# Patient Record
Sex: MALE
[De-identification: names, ages, dates, MRNs, and addresses within clinical notes are randomized per-mention and may not be internally consistent; named-entity substitution may affect disease eponyms.]

---

## 2018-09-05 ENCOUNTER — HOSPITAL ENCOUNTER (EMERGENCY)
Dept: HOSPITAL 25 - UCEAST | Age: 18
Discharge: HOME | End: 2018-09-05
Payer: COMMERCIAL

## 2018-09-05 VITALS — DIASTOLIC BLOOD PRESSURE: 75 MMHG | SYSTOLIC BLOOD PRESSURE: 123 MMHG

## 2018-09-05 DIAGNOSIS — Z88.0: ICD-10-CM

## 2018-09-05 DIAGNOSIS — J45.909: ICD-10-CM
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
## 2018-09-05 DIAGNOSIS — S43.005A: Primary | ICD-10-CM
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
## 2018-09-05 PROCEDURE — 99211 OFF/OP EST MAY X REQ PHY/QHP: CPT

## 2018-09-05 PROCEDURE — G0463 HOSPITAL OUTPT CLINIC VISIT: HCPCS

## 2018-09-05 NOTE — UC
Upper Extremity HPI





- HPI Summary


HPI Summary: 


A 19 y/o male presents to [facility] c/o resolved left shoulder dislocation. 

According to the patient, he fell on a rock while playing soccer at 1930. He 

stated that he tried pushing it back in place, however, it went back in on his 

own soon afterwards. Patient has no other known issues. Patient took 2 oral 

pills of Ibuprofen and applied ice to the area. PMHx of asthma. No current 

medications. No FHx of hypermobility. SHx of no smoking or ETOH. 











- History of Current Complaint


Chief Complaint: UCUpperExtremity


Stated Complaint: SHOULDER INJURY


Time Seen by Provider: 09/05/18 21:01


Hx Obtained From: Patient


Onset/Duration: Sudden Onset, Resolved


Severity Initially: Mild


Severity Currently: None


Pain Intensity: 3


Pain Scale Used: 0-10 Numeric


Location Of Pain: Is Discrete @ - Left shoulder


Aggravating Factor(s): Nothing


Alleviating Factor(s): Nothing


Associated Signs And Symptoms: Positive: Negative





- Allergies/Home Medications


Allergies/Adverse Reactions: 


 Allergies











Allergy/AdvReac Type Severity Reaction Status Date / Time


 


Penicillins Allergy  Unknown Verified 09/05/18 20:58





   Reaction  





   Details  











Home Medications: 


 Home Medications





Budesonide/Formote 80/4.5(NF) [Symbicort 80/4.5 (NF)] 1 puff INH BID 09/05/18 [

History Confirmed 09/05/18]


Ibuprofen TAB* [Motrin TAB* 400 MG] 1 tab PO ONCE 09/05/18 [History Confirmed 09 /05/18]











PMH/Surg Hx/FS Hx/Imm Hx


Respiratory History: Asthma





- Surgical History


Surgical History: None





- Family History


Known Family History: Positive: Other - Negative: Hypermobility





- Social History


Alcohol Use: None


Substance Use Type: None


Smoking Status (MU): Never Smoked Tobacco





Review of Systems


Constitutional: Negative


Skin: Negative


Eyes: Negative


ENT: Negative


Respiratory: Negative


Cardiovascular: Negative


Gastrointestinal: Negative


Genitourinary: Negative


Motor: Negative


Neurovascular: Negative


Musculoskeletal: Other: - POSITIVE: Resolved left shoulder dislocation


Neurological: Negative


Psychological: Negative


Is Patient Immunocompromised?: No


All Other Systems Reviewed And Are Negative: Yes





Physical Exam





- Summary


Physical Exam Summary: 


Appearance: Well appearing, no pain distress


Skin: warm, dry, reflects adequate perfusion


Head/face: normal


Eyes: EOMI, PEGGY


ENT: normal


Neck: supple, non-tender


Respiratory: CTA, breath sounds present


Cardiovascular: RRR, pulses symmetrical 


Abdomen: non-tender, soft


Bowel Sounds: present


Musculoskeletal: normal, strength/ROM intact. Left shoulder reduction that 

spontaneously reduced. Normal strength, sensation and FROM intact.


Neuro: normal, sensory motor intact, A&Ox3








Triage Information Reviewed: Yes


Vital Signs: 


 Initial Vital Signs











Temp  99.0 F   09/05/18 20:54


 


Pulse  57   09/05/18 20:54


 


Resp  18   09/05/18 20:54


 


BP  123/75   09/05/18 20:54


 


Pulse Ox  99   09/05/18 20:54











Vital Signs Reviewed: Yes





Diagnostics





- Radiology


  ** No standard instances


Radiology Interpretation Completed By: ED Physician - Negative. Pending 

official report.





Upper Extremity Course/Dx





- Course


Course Of Treatment: Patient describes left shoulder dislocation with deformity 

that was able to self reduce with help of his friends at time of injury.  He is 

now neurovascularly intact with full range of motion.  X-rays are negative.  

Placed in a sling.  Discharged in good condition.





- Differential Dx/Diagnosis


Provider Diagnoses: LEFT SHOULDER DISLOCATION WITH SPONTANEOUS REDUCTION





Discharge





- Sign-Out/Discharge


Documenting (check all that apply): Patient Departure - DISCHARGE


All imaging exams completed and their final reports reviewed: No - CXR





- Discharge Plan


Condition: Improved


Disposition: HOME


Patient Education Materials:  Shoulder Dislocation Exercises (GEN)


Referrals: 


Srikanth Santo [Primary Care Provider] - 


Additional Instructions: 


Ice, sling for first week.  I'll up with [facility].  

You may also follow-up with Hector Schwab at [facility] across the road 

from [place].  Tylenol, ibuprofen as needed for discomfort.





- Billing Disposition and Condition


Condition: IMPROVED


Disposition: Home





- Attestation Statements


Document Initiated by Scribe: Yes


Documenting Scribe: Duong Serrano


Provider For Whom Shawnibe is Documenting (Include Credential): Sin Cohen MD


Scribe Attestation: 


Duong NORMAN, scribed for Sin Cohen MD on 09/05/18 at 2131. 


Scribe Documentation Reviewed: Yes


Provider Attestation: 


The documentation as recorded by the scribDuong wasserman accurately reflects 

the service I personally performed and the decisions made by me, Sin Cohen MD

## 2018-09-06 NOTE — RAD
Indication: Left shoulder pain.



4 views of left shoulder demonstrates no fracture or dislocation. No other bone or joint

abnormality is identified.



IMPRESSION: No fracture of the left shoulder is noted.



R0

## 2018-09-06 NOTE — UC
- Progress Note


Progress Note: 





XR radiologist read:





IMPRESSION: No fracture of the left shoulder is noted.





No change in plan of care





Discharge





- Sign-Out/Discharge


Documenting (check all that apply): Post-Discharge Follow Up


All imaging exams completed and their final reports reviewed: Yes





- Discharge Plan


Condition: Improved


Disposition: HOME


Patient Education Materials:  Shoulder Dislocation Exercises (GEN)


Referrals: 


Srikanth Santo [Primary Care Provider] - 


Additional Instructions: 


Ice, sling for first week.  I'll up with [facility].  

You may also follow-up with Hector Schwab at [facility] across the road 

from [place].  Tylenol, ibuprofen as needed for discomfort.





- Billing Disposition and Condition


Condition: IMPROVED


Disposition: Home